# Patient Record
(demographics unavailable — no encounter records)

---

## 2025-05-02 NOTE — HISTORY OF PRESENT ILLNESS
[FreeTextEntry1] : 73F with DLD, HTN, left foot spur 2015, L5 pathology, here for cardiac and vascular evaluation  Referring: Dr. Sandra Rosario  5/2/2025 Hxo spider veins, had LLE venous procedure done 2019, specifics unknown.  Cramps in legs and feet at night. No swelling, leg fatigue or heaviness Wondering if she should have further procedures for spider veins (sclerotherapy) Torn meniscus b/l, never had surgeries on knees/hips No history DVT LE venous US 4/2021 negative for dVT b/l  Occasional palpitations. Once every 3 months, last 3-4 minutes  Usually walks every other day, but slowed down 6 months ago due to leg pain SAM with stairs   Quit smoking late 30s, social, never a pack per day  FMH Mother: varicose veins, dementia Father; cancer No FMH of CAD, or CVA

## 2025-05-02 NOTE — ASSESSMENT
[FreeTextEntry1] : Assessment: #Chronic venous insufficiency  - Tried compression and failed #Leg/foot cramping #Mild MR, mild TR on OSH 2021 TTE #DLD #Former tobacco user   3/2025 Cr 0.87, K 4.3   TSH 1.22 Hgb 12.3 A1c 5.4%  Plan: - Echo - Vein mapping - CHINO/PVR and LE art US - Continue rosuva 5 for now  - Continue valsartan 40 mg daily  - Return to clinic in 3-4 months or sooner PRN

## 2025-05-02 NOTE — PHYSICAL EXAM
[Well Developed] : well developed [Well Nourished] : well nourished [No Acute Distress] : no acute distress [Normal Conjunctiva] : normal conjunctiva [Normal Venous Pressure] : normal venous pressure [No Carotid Bruit] : no carotid bruit [Normal S1, S2] : normal S1, S2 [No Murmur] : no murmur [No Rub] : no rub [No Gallop] : no gallop [Clear Lung Fields] : clear lung fields [Good Air Entry] : good air entry [No Respiratory Distress] : no respiratory distress  [Soft] : abdomen soft [Non Tender] : non-tender [No Masses/organomegaly] : no masses/organomegaly [Normal Bowel Sounds] : normal bowel sounds [Moves all extremities] : moves all extremities [No Focal Deficits] : no focal deficits [Normal Speech] : normal speech [No ulcers] : no ulcers [No edema] : no edema [No cyanosis] : no cyanosis [No rashes] : no rashes [Normal peripheral pulses] : : normal peripheral pulses [Present] : Left Lower Extremity: present [Calf and Thigh] : negative on the calf and thigh [Absent] : Left Lower Extremity: absent

## 2025-05-02 NOTE — CARDIOLOGY SUMMARY
[de-identified] : EKG 5/2/2025 Normal sinus rhythm  [de-identified] : [facility] stress echo report 7/2021  E 71%, 6 minutes, 7.5 METs normal echo response